# Patient Record
Sex: FEMALE | Race: WHITE | Employment: OTHER | URBAN - METROPOLITAN AREA
[De-identification: names, ages, dates, MRNs, and addresses within clinical notes are randomized per-mention and may not be internally consistent; named-entity substitution may affect disease eponyms.]

---

## 2017-01-17 RX ORDER — AMITRIPTYLINE HYDROCHLORIDE 10 MG/1
TABLET, FILM COATED ORAL
Qty: 30 TABLET | Refills: 1 | Status: SHIPPED | OUTPATIENT
Start: 2017-01-17 | End: 2017-03-01

## 2017-02-24 RX ORDER — LOSARTAN POTASSIUM 50 MG/1
TABLET ORAL
Qty: 180 TABLET | Refills: 0 | Status: SHIPPED | OUTPATIENT
Start: 2017-02-24 | End: 2017-06-12

## 2017-03-01 ENCOUNTER — OFFICE VISIT (OUTPATIENT)
Dept: FAMILY MEDICINE CLINIC | Facility: CLINIC | Age: 77
End: 2017-03-01

## 2017-03-01 ENCOUNTER — LAB ENCOUNTER (OUTPATIENT)
Dept: LAB | Age: 77
End: 2017-03-01
Attending: FAMILY MEDICINE
Payer: MEDICARE

## 2017-03-01 VITALS
BODY MASS INDEX: 23 KG/M2 | DIASTOLIC BLOOD PRESSURE: 70 MMHG | WEIGHT: 143 LBS | HEART RATE: 74 BPM | RESPIRATION RATE: 16 BRPM | SYSTOLIC BLOOD PRESSURE: 130 MMHG

## 2017-03-01 DIAGNOSIS — Z23 NEED FOR PNEUMOCOCCAL VACCINATION: ICD-10-CM

## 2017-03-01 DIAGNOSIS — E78.00 HIGH CHOLESTEROL: ICD-10-CM

## 2017-03-01 DIAGNOSIS — J41.0 SMOKERS' COUGH (HCC): ICD-10-CM

## 2017-03-01 DIAGNOSIS — J43.8 OTHER EMPHYSEMA (HCC): ICD-10-CM

## 2017-03-01 DIAGNOSIS — Z00.00 MEDICARE ANNUAL WELLNESS VISIT, SUBSEQUENT: ICD-10-CM

## 2017-03-01 DIAGNOSIS — F17.200 TOBACCO DEPENDENCE: ICD-10-CM

## 2017-03-01 DIAGNOSIS — E78.2 MIXED HYPERLIPIDEMIA: ICD-10-CM

## 2017-03-01 DIAGNOSIS — I10 ESSENTIAL HYPERTENSION: ICD-10-CM

## 2017-03-01 DIAGNOSIS — E78.2 MIXED HYPERLIPIDEMIA: Primary | ICD-10-CM

## 2017-03-01 DIAGNOSIS — M19.032 ARTHRITIS OF LEFT WRIST: ICD-10-CM

## 2017-03-01 DIAGNOSIS — E55.9 VITAMIN D DEFICIENCY: ICD-10-CM

## 2017-03-01 DIAGNOSIS — G47.09 OTHER INSOMNIA: ICD-10-CM

## 2017-03-01 LAB
25-HYDROXYVITAMIN D (TOTAL): 36.8 NG/ML (ref 30–100)
ALBUMIN SERPL-MCNC: 3.3 G/DL (ref 3.5–4.8)
ALP LIVER SERPL-CCNC: 107 U/L (ref 55–142)
ALT SERPL-CCNC: 24 U/L (ref 14–54)
AST SERPL-CCNC: 29 U/L (ref 15–41)
BASOPHILS # BLD AUTO: 0.07 X10(3) UL (ref 0–0.1)
BASOPHILS NFR BLD AUTO: 0.9 %
BILIRUB SERPL-MCNC: 0.5 MG/DL (ref 0.1–2)
BUN BLD-MCNC: 19 MG/DL (ref 8–20)
CALCIUM BLD-MCNC: 9.3 MG/DL (ref 8.3–10.3)
CHLORIDE: 108 MMOL/L (ref 101–111)
CHOLEST SMN-MCNC: 189 MG/DL (ref ?–200)
CO2: 28 MMOL/L (ref 22–32)
CREAT BLD-MCNC: 1.02 MG/DL (ref 0.55–1.02)
EOSINOPHIL # BLD AUTO: 0.35 X10(3) UL (ref 0–0.3)
EOSINOPHIL NFR BLD AUTO: 4.7 %
ERYTHROCYTE [DISTWIDTH] IN BLOOD BY AUTOMATED COUNT: 15 % (ref 11.5–16)
GLUCOSE BLD-MCNC: 98 MG/DL (ref 70–99)
HCT VFR BLD AUTO: 41.9 % (ref 34–50)
HDLC SERPL-MCNC: 125 MG/DL (ref 45–?)
HDLC SERPL: 1.51 {RATIO} (ref ?–4.44)
HGB BLD-MCNC: 13.9 G/DL (ref 12–16)
IMMATURE GRANULOCYTE COUNT: 0.01 X10(3) UL (ref 0–1)
IMMATURE GRANULOCYTE RATIO %: 0.1 %
LDLC SERPL CALC-MCNC: 48 MG/DL (ref ?–130)
LYMPHOCYTES # BLD AUTO: 2.3 X10(3) UL (ref 0.9–4)
LYMPHOCYTES NFR BLD AUTO: 31.1 %
M PROTEIN MFR SERPL ELPH: 7.1 G/DL (ref 6.1–8.3)
MCH RBC QN AUTO: 29.5 PG (ref 27–33.2)
MCHC RBC AUTO-ENTMCNC: 33.2 G/DL (ref 31–37)
MCV RBC AUTO: 89 FL (ref 81–100)
MONOCYTES # BLD AUTO: 0.48 X10(3) UL (ref 0.1–0.6)
MONOCYTES NFR BLD AUTO: 6.5 %
NEUTROPHIL ABS PRELIM: 4.19 X10 (3) UL (ref 1.3–6.7)
NEUTROPHILS # BLD AUTO: 4.19 X10(3) UL (ref 1.3–6.7)
NEUTROPHILS NFR BLD AUTO: 56.7 %
NONHDLC SERPL-MCNC: 64 MG/DL (ref ?–130)
PLATELET # BLD AUTO: 269 10(3)UL (ref 150–450)
POTASSIUM SERPL-SCNC: 4.1 MMOL/L (ref 3.6–5.1)
RBC # BLD AUTO: 4.71 X10(6)UL (ref 3.8–5.1)
RED CELL DISTRIBUTION WIDTH-SD: 49.6 FL (ref 35.1–46.3)
SODIUM SERPL-SCNC: 142 MMOL/L (ref 136–144)
TRIGLYCERIDES: 81 MG/DL (ref ?–150)
TSI SER-ACNC: 1.44 MIU/ML (ref 0.35–5.5)
VLDL: 16 MG/DL (ref 5–40)
WBC # BLD AUTO: 7.4 X10(3) UL (ref 4–13)

## 2017-03-01 PROCEDURE — 87591 N.GONORRHOEAE DNA AMP PROB: CPT

## 2017-03-01 PROCEDURE — 96160 PT-FOCUSED HLTH RISK ASSMT: CPT | Performed by: FAMILY MEDICINE

## 2017-03-01 PROCEDURE — 87491 CHLMYD TRACH DNA AMP PROBE: CPT

## 2017-03-01 PROCEDURE — 36415 COLL VENOUS BLD VENIPUNCTURE: CPT

## 2017-03-01 PROCEDURE — 80053 COMPREHEN METABOLIC PANEL: CPT

## 2017-03-01 PROCEDURE — 90732 PPSV23 VACC 2 YRS+ SUBQ/IM: CPT | Performed by: FAMILY MEDICINE

## 2017-03-01 PROCEDURE — 82306 VITAMIN D 25 HYDROXY: CPT

## 2017-03-01 PROCEDURE — G0009 ADMIN PNEUMOCOCCAL VACCINE: HCPCS | Performed by: FAMILY MEDICINE

## 2017-03-01 PROCEDURE — 85025 COMPLETE CBC W/AUTO DIFF WBC: CPT

## 2017-03-01 PROCEDURE — 84443 ASSAY THYROID STIM HORMONE: CPT

## 2017-03-01 PROCEDURE — 80061 LIPID PANEL: CPT

## 2017-03-01 RX ORDER — ATORVASTATIN CALCIUM 10 MG/1
10 TABLET, FILM COATED ORAL NIGHTLY
Qty: 90 TABLET | Refills: 1 | Status: SHIPPED | OUTPATIENT
Start: 2017-03-01 | End: 2017-10-19

## 2017-03-01 RX ORDER — TIZANIDINE 2 MG/1
2 TABLET ORAL EVERY 8 HOURS PRN
COMMUNITY
End: 2017-06-07

## 2017-03-01 RX ORDER — AMITRIPTYLINE HYDROCHLORIDE 10 MG/1
TABLET, FILM COATED ORAL
Qty: 30 TABLET | Refills: 3 | Status: SHIPPED | OUTPATIENT
Start: 2017-03-01 | End: 2017-04-21

## 2017-03-02 LAB
C TRACH DNA SPEC QL NAA+PROBE: NEGATIVE
N GONORRHOEA DNA SPEC QL NAA+PROBE: NEGATIVE

## 2017-03-03 NOTE — PROGRESS NOTES
HPI:   Micah Yepez is a 67year old female who presents for a MA (Medicare Advantage) Supervisit (Once per calendar year).   Her last annual assessment has been over 1 year: Annual Physical due on 02/08/2017         Patient Care Team: Patient Care Team: cholecystectomy; back surgery; hernia surgery; cholecystectomy; spine surgery procedure unlisted; colonoscopy & polypectomy (2/15); and colonoscopy (N/A, 2/12/2015). Her family history includes Breast Cancer (age of onset: 48) in her maternal aunt.    SO no curvature, ROM normal, no CVA tenderness   Lungs:   Clear to auscultation bilaterally, respirations unlabored   Heart:  Regular rate and rhythm, S1 and S2 normal, no murmur, rub, or gallop   Abdomen:   Soft, non-tender, bowel sounds active all four quad of left wrist  Plan: ASSAY, THYROID STIM HORMONE        -pain's stable, no new complaints. Asymptomatic, today. (F17.200) Tobacco dependence  Plan: -encouraged cessation.     (J41.0) Smokers' cough ([facility] [address]  Plan: -as above.      (Z23) Need for pneumococca Functional Status     Hearing Problems?: No    Vision Problems? : No    Difficulty walking?: No    Difficulty dressing or bathing?: No    Problems with daily activities? : No    Memory Problems?: No      Fall/Risk Assessment     Do you have 3 or more m EKG - w/ Initial Preventative Physical Exam only, or if medically necessary Electrocardiogram date07/09/2015       Colorectal Cancer Screening      Colonoscopy Screen every 10 years Colonoscopy,5 Years due on 02/12/2020 Update Health Maintenance if applica Orders placed or performed in visit on 02/19/15  -TETANUS, DIPHTHERIA TOXOIDS AND ACELLULAR PERTUSIS VACCINE (TDAP), >7 YEARS, IM USE            SPECIFIC DISEASE MONITORING Internal Lab or Procedure External Lab or Procedure   Annual Monitoring of Persis

## 2017-03-08 ENCOUNTER — TELEPHONE (OUTPATIENT)
Dept: FAMILY MEDICINE CLINIC | Facility: CLINIC | Age: 77
End: 2017-03-08

## 2017-03-08 DIAGNOSIS — E78.2 MIXED HYPERLIPIDEMIA: Primary | ICD-10-CM

## 2017-03-08 NOTE — TELEPHONE ENCOUNTER
----- Message from Raven Li MD sent at 3/7/2017  2:15 PM CST -----  Let patient know of high cholesterol. Needs to follow low fat, low cholesterol,  decrease carbs and increase activity.     Meds: continue same meds  Repeat ast/alt and lipid panel in 6

## 2017-03-08 NOTE — TELEPHONE ENCOUNTER
I called pt at [phone] and verified 2 patient identifiers: name & . I discussed results and recommendations at length with patient. Discussed normal cholesterol readings and to continue with current medication, diet, and exercise.   All orders pl

## 2017-03-29 ENCOUNTER — PATIENT OUTREACH (OUTPATIENT)
Dept: CASE MANAGEMENT | Age: 77
End: 2017-03-29

## 2017-03-29 RX ORDER — MIRTAZAPINE 30 MG/1
TABLET, FILM COATED ORAL
Qty: 90 TABLET | Refills: 0 | Status: SHIPPED
Start: 2017-03-29 | End: 2017-04-21

## 2017-03-29 NOTE — TELEPHONE ENCOUNTER
Pt was taking this medication a couple of months ago but stopped taking it because she recently moved and misplaced the bottle. Pt found the bottle and continued to take this medication.  She is now requesting a refill and states that she is not taking anyt

## 2017-04-12 ENCOUNTER — PATIENT OUTREACH (OUTPATIENT)
Dept: CASE MANAGEMENT | Age: 77
End: 2017-04-12

## 2017-04-12 NOTE — PROGRESS NOTES
4/12/2017  Spoke to Karon Nelson at length about CCM, current care plan and performed CCM assessment with Karon Nelson reviewed meds and compliance.  Reviewed pt COPD, HTN    Health Maintenance:annual up to date 3/1/17, mammogram- due April 2017 colonoscopy due in 2020

## 2017-04-20 ENCOUNTER — TELEPHONE (OUTPATIENT)
Dept: FAMILY MEDICINE CLINIC | Facility: CLINIC | Age: 77
End: 2017-04-20

## 2017-04-20 DIAGNOSIS — G47.09 OTHER INSOMNIA: Primary | ICD-10-CM

## 2017-04-21 NOTE — TELEPHONE ENCOUNTER
I spoke to patient's daughter Nidia Zeng per HIPAA. She states patient should be taking amitriptyline. Per our records, mirtazepine has not been prescribed for patient since 2015.  Per Nidia Zeng, patient will continue with amitriptyline as this is the most recently

## 2017-04-21 NOTE — TELEPHONE ENCOUNTER
From voicemail: Pt received a call from pharmacy for refill of Amitriptyline, she does not know if this is correct or not.

## 2017-04-22 RX ORDER — AMITRIPTYLINE HYDROCHLORIDE 10 MG/1
TABLET, FILM COATED ORAL
Qty: 30 TABLET | Refills: 0 | Status: SHIPPED | OUTPATIENT
Start: 2017-04-22 | End: 2017-06-05

## 2017-04-25 ENCOUNTER — OFFICE VISIT (OUTPATIENT)
Dept: FAMILY MEDICINE CLINIC | Facility: CLINIC | Age: 77
End: 2017-04-25

## 2017-04-25 VITALS
OXYGEN SATURATION: 97 % | RESPIRATION RATE: 16 BRPM | SYSTOLIC BLOOD PRESSURE: 138 MMHG | HEIGHT: 66 IN | WEIGHT: 141 LBS | TEMPERATURE: 98 F | BODY MASS INDEX: 22.66 KG/M2 | HEART RATE: 77 BPM | DIASTOLIC BLOOD PRESSURE: 80 MMHG

## 2017-04-25 DIAGNOSIS — L02.214 ABSCESS OF GROIN, RIGHT: Primary | ICD-10-CM

## 2017-04-25 PROCEDURE — 87205 SMEAR GRAM STAIN: CPT | Performed by: EMERGENCY MEDICINE

## 2017-04-25 PROCEDURE — 87186 SC STD MICRODIL/AGAR DIL: CPT | Performed by: EMERGENCY MEDICINE

## 2017-04-25 PROCEDURE — 99214 OFFICE O/P EST MOD 30 MIN: CPT | Performed by: EMERGENCY MEDICINE

## 2017-04-25 PROCEDURE — 87147 CULTURE TYPE IMMUNOLOGIC: CPT | Performed by: EMERGENCY MEDICINE

## 2017-04-25 PROCEDURE — 87070 CULTURE OTHR SPECIMN AEROBIC: CPT | Performed by: EMERGENCY MEDICINE

## 2017-04-25 RX ORDER — AMOXICILLIN AND CLAVULANATE POTASSIUM 875; 125 MG/1; MG/1
1 TABLET, FILM COATED ORAL 2 TIMES DAILY
Qty: 20 TABLET | Refills: 0 | Status: SHIPPED | OUTPATIENT
Start: 2017-04-25 | End: 2017-05-05

## 2017-04-25 NOTE — PROGRESS NOTES
Chief Complaint:   Patient presents with:  Abscess (integumentary): in pelvic area, noticed a month ago     HPI:   This is a 67year old female     C/O painful mass to right groin area. Started 1-2 months ago with a small tender area.  Recently over the pas LOSARTAN POTASSIUM 50 MG Oral Tab TAKE ONE TABLET BY MOUTH TWICE DAILY Disp: 180 tablet Rfl: 0   HYDROcodone-acetaminophen (NORCO)  MG Oral Tab Take 1 tablet by mouth every 8 (eight) hours as needed for Pain.  Disp: 20 tablet Rfl: 0   aspirin 81 MG Weight as of this encounter: 141 lb. Vital signs reviewed. Appears stated age, well groomed.   GENERAL: well developed, well nourished, well hydrated, no distress  SKIN: good skin turgor, no obvious rashes    LUNGS: clear to auscultation, no RRW  CARDIO: R

## 2017-04-25 NOTE — PATIENT INSTRUCTIONS
Thank you for choosing [facility]  To Do:  FOR ROBBY ZEE  1. Warm compresses to area 4-5 times a day for 15-20 minutes each time  2. Take antibiotics as directed  3. Follow-up in 7-10 days  4.  Area protected cover loosely with nonadherent tita leading away from the abscess  · An increase in local pain or swelling  · Fever of 100.4ºF (38ºC) or higher, or as directed by your healthcare provider  · Pus or fluid coming from the abscess  · Boil returns after getting better  Date Last Reviewed: 9/1/20

## 2017-05-01 ENCOUNTER — TELEPHONE (OUTPATIENT)
Dept: FAMILY MEDICINE CLINIC | Facility: CLINIC | Age: 77
End: 2017-05-01

## 2017-05-01 RX ORDER — SULFAMETHOXAZOLE AND TRIMETHOPRIM 800; 160 MG/1; MG/1
1 TABLET ORAL 2 TIMES DAILY
Qty: 20 TABLET | Refills: 0 | Status: SHIPPED | OUTPATIENT
Start: 2017-05-01

## 2017-05-01 NOTE — TELEPHONE ENCOUNTER
INESSA.    I called pt at [phone] and verified 2 patient identifiers: name & . I discussed results and recommendations at length with patient. All orders placed. Pt states she has no way of coming back to the office.   Her daughter got a new job and

## 2017-05-01 NOTE — TELEPHONE ENCOUNTER
----- Message from Adina Ash MD sent at 5/1/2017 11:41 AM CDT -----  Pls have pt D/C Augmentin    Pls Rx Bactrim DS 1 tab BID for 10 days  Must be rechecked in 1-2 weeks

## 2017-05-19 ENCOUNTER — PATIENT OUTREACH (OUTPATIENT)
Dept: CASE MANAGEMENT | Age: 77
End: 2017-05-19

## 2017-05-19 NOTE — PROGRESS NOTES
Following up with patient to see how everything was going. Patient states that she has to change doctor's do to transportation issues. Patient already called her insurance and changed providers.  I did mention to patient about Senior services but patient st

## 2017-06-06 RX ORDER — AMITRIPTYLINE HYDROCHLORIDE 10 MG/1
TABLET, FILM COATED ORAL
Qty: 30 TABLET | Refills: 0 | Status: SHIPPED | OUTPATIENT
Start: 2017-06-06

## 2017-06-08 RX ORDER — TIZANIDINE 2 MG/1
2 TABLET ORAL EVERY 8 HOURS PRN
Qty: 90 TABLET | Refills: 0 | Status: SHIPPED | OUTPATIENT
Start: 2017-06-08

## 2017-06-12 RX ORDER — LOSARTAN POTASSIUM 50 MG/1
TABLET ORAL
Qty: 180 TABLET | Refills: 0 | Status: SHIPPED | OUTPATIENT
Start: 2017-06-12 | End: 2017-09-09

## 2017-08-05 ENCOUNTER — HOSPITAL (OUTPATIENT)
Dept: OTHER | Age: 77
End: 2017-08-05

## 2017-08-05 ENCOUNTER — IMAGING SERVICES (OUTPATIENT)
Dept: OTHER | Age: 77
End: 2017-08-05

## 2017-09-09 RX ORDER — LOSARTAN POTASSIUM 50 MG/1
TABLET ORAL
Qty: 180 TABLET | Refills: 0 | Status: SHIPPED | OUTPATIENT
Start: 2017-09-09 | End: 2017-12-07

## 2017-10-14 ENCOUNTER — HOSPITAL (OUTPATIENT)
Dept: OTHER | Age: 77
End: 2017-10-14

## 2017-10-14 ENCOUNTER — IMAGING SERVICES (OUTPATIENT)
Dept: OTHER | Age: 77
End: 2017-10-14

## 2017-10-19 DIAGNOSIS — E78.00 HIGH CHOLESTEROL: ICD-10-CM

## 2017-10-19 RX ORDER — ATORVASTATIN CALCIUM 10 MG/1
10 TABLET, FILM COATED ORAL NIGHTLY
Qty: 90 TABLET | Refills: 1 | Status: SHIPPED | OUTPATIENT
Start: 2017-10-19 | End: 2018-05-04

## 2017-11-24 ENCOUNTER — HOSPITAL (OUTPATIENT)
Dept: OTHER | Age: 77
End: 2017-11-24
Attending: INTERNAL MEDICINE

## 2017-11-24 LAB
ANALYZER ANC (IANC): NORMAL
ANION GAP SERPL CALC-SCNC: 13 MMOL/L (ref 10–20)
BASOPHILS # BLD: 0 THOUSAND/MCL (ref 0–0.3)
BASOPHILS NFR BLD: 1 %
BUN SERPL-MCNC: 21 MG/DL (ref 6–20)
BUN/CREAT SERPL: 15 (ref 7–25)
CALCIUM SERPL-MCNC: 9 MG/DL (ref 8.4–10.2)
CHLORIDE: 105 MMOL/L (ref 98–107)
CO2 SERPL-SCNC: 27 MMOL/L (ref 21–32)
CREAT SERPL-MCNC: 1.44 MG/DL (ref 0.51–0.95)
DIFFERENTIAL METHOD BLD: NORMAL
EOSINOPHIL # BLD: 0.2 THOUSAND/MCL (ref 0.1–0.5)
EOSINOPHIL NFR BLD: 3 %
ERYTHROCYTE [DISTWIDTH] IN BLOOD: 14.4 % (ref 11–15)
GLUCOSE SERPL-MCNC: 136 MG/DL (ref 65–99)
HEMATOCRIT: 40.2 % (ref 36–46.5)
HGB BLD-MCNC: 12.9 GM/DL (ref 12–15.5)
LYMPHOCYTES # BLD: 1.9 THOUSAND/MCL (ref 1–4)
LYMPHOCYTES NFR BLD: 32 %
MAGNESIUM SERPL-MCNC: 1.7 MG/DL (ref 1.7–2.4)
MCH RBC QN AUTO: 28.8 PG (ref 26–34)
MCHC RBC AUTO-ENTMCNC: 32.1 GM/DL (ref 32–36.5)
MCV RBC AUTO: 89.7 FL (ref 78–100)
MONOCYTES # BLD: 0.3 THOUSAND/MCL (ref 0.3–0.9)
MONOCYTES NFR BLD: 5 %
NEUTROPHILS # BLD: 3.6 THOUSAND/MCL (ref 1.8–7.7)
NEUTROPHILS NFR BLD: 59 %
NEUTS SEG NFR BLD: NORMAL %
PERCENT NRBC: NORMAL
PLATELET # BLD: 238 THOUSAND/MCL (ref 140–450)
POTASSIUM SERPL-SCNC: 3.8 MMOL/L (ref 3.4–5.1)
RBC # BLD: 4.48 MILLION/MCL (ref 4–5.2)
SODIUM SERPL-SCNC: 141 MMOL/L (ref 135–145)
TROPONIN I SERPL HS-MCNC: 0.02 NG/ML
TSH SERPL-ACNC: 2.17 MCUNIT/ML (ref 0.35–5)
WBC # BLD: 6 THOUSAND/MCL (ref 4.2–11)

## 2017-11-25 ENCOUNTER — DIAGNOSTIC TRANS (OUTPATIENT)
Dept: OTHER | Age: 77
End: 2017-11-25

## 2017-11-25 LAB
ANION GAP SERPL CALC-SCNC: 9 MMOL/L (ref 10–20)
APPEARANCE UR: CLEAR
BACTERIA #/AREA URNS HPF: NORMAL /HPF
BILIRUB UR QL: NEGATIVE
BUN SERPL-MCNC: 17 MG/DL (ref 6–20)
BUN/CREAT SERPL: 14 (ref 7–25)
CALCIUM SERPL-MCNC: 8.5 MG/DL (ref 8.4–10.2)
CHLORIDE: 109 MMOL/L (ref 98–107)
CO2 SERPL-SCNC: 28 MMOL/L (ref 21–32)
COLOR UR: YELLOW
CREAT SERPL-MCNC: 1.18 MG/DL (ref 0.51–0.95)
GLUCOSE SERPL-MCNC: 80 MG/DL (ref 65–99)
GLUCOSE UR-MCNC: NEGATIVE MG/DL
HGB UR QL: ABNORMAL
HYALINE CASTS #/AREA URNS LPF: NORMAL /LPF (ref 0–5)
KETONES UR-MCNC: NEGATIVE MG/DL
LEUKOCYTE ESTERASE UR QL STRIP: ABNORMAL
MICROSCOPIC (MT): ABNORMAL
NITRITE UR QL: NEGATIVE
PH UR: 6.5 UNIT (ref 5–7)
POTASSIUM SERPL-SCNC: 3.6 MMOL/L (ref 3.4–5.1)
PROT UR QL: NEGATIVE MG/DL
RBC #/AREA URNS HPF: NORMAL /HPF (ref 0–3)
SODIUM SERPL-SCNC: 142 MMOL/L (ref 135–145)
SP GR UR: <1.005 (ref 1–1.03)
SPECIMEN SOURCE: ABNORMAL
SQUAMOUS #/AREA URNS HPF: NORMAL /HPF (ref 0–5)
UROBILINOGEN UR QL: 0.2 MG/DL (ref 0–1)
WBC #/AREA URNS HPF: NORMAL /HPF (ref 0–5)

## 2017-12-07 RX ORDER — LOSARTAN POTASSIUM 50 MG/1
TABLET ORAL
Qty: 180 TABLET | Refills: 0 | Status: SHIPPED | OUTPATIENT
Start: 2017-12-07

## 2017-12-07 NOTE — TELEPHONE ENCOUNTER
Medication(s) to Refill:   Pending Prescriptions Disp Refills    LOSARTAN POTASSIUM 50 MG Oral Tab [Pharmacy Med Name: LOSARTAN POTASSIUM 50 MG TAB] 180 tablet 0     Sig: TAKE ONE TABLET BY MOUTH TWICE DAILY           Last Time Medication was Filled:  9/9/

## 2018-02-02 ENCOUNTER — PATIENT OUTREACH (OUTPATIENT)
Dept: FAMILY MEDICINE CLINIC | Facility: CLINIC | Age: 78
End: 2018-02-02

## 2018-02-02 NOTE — PROGRESS NOTES
Spoke to pt and pt stated she has moved to [facility] and found a new PCP, pt aware to change ins

## 2018-02-03 ENCOUNTER — HOSPITAL (OUTPATIENT)
Dept: OTHER | Age: 78
End: 2018-02-03

## 2018-02-13 LAB
ALBUMIN SERPL-MCNC: 3.5 GM/DL (ref 3.6–5.1)
ALP SERPL-CCNC: 98 UNIT/L (ref 45–117)
ALT SERPL-CCNC: 21 UNIT/L
ANALYZER ANC (IANC): NORMAL
ANION GAP SERPL CALC-SCNC: 13 MMOL/L (ref 10–20)
APPEARANCE UR: CLEAR
AST SERPL-CCNC: 22 UNIT/L
BACTERIA #/AREA URNS HPF: NORMAL /HPF
BASOPHILS # BLD: 0 THOUSAND/MCL (ref 0–0.3)
BASOPHILS NFR BLD: 0 %
BILIRUB CONJ SERPL-MCNC: 0.1 MG/DL (ref 0–0.2)
BILIRUB SERPL-MCNC: 0.6 MG/DL (ref 0.2–1)
BILIRUB UR QL: NEGATIVE
BUN SERPL-MCNC: 15 MG/DL (ref 6–20)
BUN/CREAT SERPL: 15 (ref 7–25)
CALCIUM SERPL-MCNC: 9.6 MG/DL (ref 8.4–10.2)
CHLORIDE: 102 MMOL/L (ref 98–107)
CO2 SERPL-SCNC: 27 MMOL/L (ref 21–32)
COLOR UR: YELLOW
CREAT SERPL-MCNC: 1.03 MG/DL (ref 0.51–0.95)
DIFFERENTIAL METHOD BLD: NORMAL
EOSINOPHIL # BLD: 0.1 THOUSAND/MCL (ref 0.1–0.5)
EOSINOPHIL NFR BLD: 1 %
ERYTHROCYTE [DISTWIDTH] IN BLOOD: 14.4 % (ref 11–15)
GLUCOSE SERPL-MCNC: 97 MG/DL (ref 65–99)
GLUCOSE UR-MCNC: NEGATIVE MG/DL
HEMATOCRIT: 41.1 % (ref 36–46.5)
HGB BLD-MCNC: 13.6 GM/DL (ref 12–15.5)
HGB UR QL: ABNORMAL
HYALINE CASTS #/AREA URNS LPF: NORMAL /LPF (ref 0–5)
KETONES UR-MCNC: NEGATIVE MG/DL
LEUKOCYTE ESTERASE UR QL STRIP: NEGATIVE
LIPASE SERPL-CCNC: 110 UNIT/L (ref 73–393)
LYMPHOCYTES # BLD: 1.9 THOUSAND/MCL (ref 1–4)
LYMPHOCYTES NFR BLD: 22 %
MCH RBC QN AUTO: 29 PG (ref 26–34)
MCHC RBC AUTO-ENTMCNC: 33.1 GM/DL (ref 32–36.5)
MCV RBC AUTO: 87.6 FL (ref 78–100)
MICROSCOPIC (MT): ABNORMAL
MONOCYTES # BLD: 0.9 THOUSAND/MCL (ref 0.3–0.9)
MONOCYTES NFR BLD: 10 %
NEUTROPHILS # BLD: 5.8 THOUSAND/MCL (ref 1.8–7.7)
NEUTROPHILS NFR BLD: 67 %
NEUTS SEG NFR BLD: NORMAL %
NITRITE UR QL: NEGATIVE
PERCENT NRBC: NORMAL
PH UR: 8 UNIT (ref 5–7)
PLATELET # BLD: 268 THOUSAND/MCL (ref 140–450)
POTASSIUM SERPL-SCNC: 3.6 MMOL/L (ref 3.4–5.1)
PROT SERPL-MCNC: 7.3 GM/DL (ref 6.4–8.2)
PROT UR QL: ABNORMAL MG/DL
RBC # BLD: 4.69 MILLION/MCL (ref 4–5.2)
RBC #/AREA URNS HPF: NORMAL /HPF (ref 0–3)
SODIUM SERPL-SCNC: 138 MMOL/L (ref 135–145)
SP GR UR: 1.01 (ref 1–1.03)
SPECIMEN SOURCE: ABNORMAL
SQUAMOUS #/AREA URNS HPF: NORMAL /HPF (ref 0–5)
UROBILINOGEN UR QL: 0.2 MG/DL (ref 0–1)
WBC # BLD: 8.7 THOUSAND/MCL (ref 4.2–11)
WBC #/AREA URNS HPF: NORMAL /HPF (ref 0–5)

## 2018-02-14 ENCOUNTER — HOSPITAL (OUTPATIENT)
Dept: OTHER | Age: 78
End: 2018-02-14

## 2018-02-14 ENCOUNTER — IMAGING SERVICES (OUTPATIENT)
Dept: OTHER | Age: 78
End: 2018-02-14

## 2018-02-14 ENCOUNTER — DIAGNOSTIC TRANS (OUTPATIENT)
Dept: OTHER | Age: 78
End: 2018-02-14

## 2018-02-14 LAB
ANA SER QL IA: NEGATIVE
APPEARANCE UR: CLEAR
BACTERIA #/AREA URNS HPF: ABNORMAL /HPF
BILIRUB UR QL: NEGATIVE
C3 SERPL-MCNC: 80 MG/DL (ref 79–152)
C4 SERPL-MCNC: 20.1 MG/DL (ref 16–38)
CANCER AG19-9 SERPL-ACNC: 6 UNIT/ML (ref 0–35)
CHOLEST SERPL-MCNC: 154 MG/DL
CHOLEST/HDLC SERPL: 1.5 {RATIO}
COLOR UR: YELLOW
CRP SERPL-MCNC: 8.2 MG/DL
ERYTHROCYTE [SEDIMENTATION RATE] IN BLOOD BY WESTERGREN METHOD: 25 MM/HR (ref 0–20)
GLUCOSE BLDC GLUCOMTR-MCNC: 94 MG/DL (ref 65–99)
GLUCOSE UR-MCNC: NEGATIVE MG/DL
HDLC SERPL-MCNC: 103 MG/DL
HGB UR QL: ABNORMAL
HYALINE CASTS #/AREA URNS LPF: ABNORMAL /LPF (ref 0–5)
KETONES UR-MCNC: NEGATIVE MG/DL
LDLC SERPL CALC-MCNC: 40 MG/DL
LEUKOCYTE ESTERASE UR QL STRIP: NEGATIVE
MUCOUS THREADS URNS QL MICRO: PRESENT
NITRITE UR QL: NEGATIVE
NONHDLC SERPL-MCNC: 51 MG/DL
PH UR: 6.5 UNIT (ref 5–7)
PROT UR QL: NEGATIVE MG/DL
RBC #/AREA URNS HPF: ABNORMAL /HPF (ref 0–3)
SP GR UR: 1.01 (ref 1–1.03)
SPECIMEN SOURCE: ABNORMAL
SQUAMOUS #/AREA URNS HPF: ABNORMAL /HPF (ref 0–5)
TRIGLYCERIDE (TRIGP): 53 MG/DL
TSH SERPL-ACNC: 2.23 MCUNIT/ML (ref 0.35–5)
URATE SERPL-MCNC: 3.9 MG/DL (ref 2.6–5.9)
UROBILINOGEN UR QL: 0.2 MG/DL (ref 0–1)
WBC #/AREA URNS HPF: ABNORMAL /HPF (ref 0–5)

## 2018-02-15 LAB
ANION GAP SERPL CALC-SCNC: 12 MMOL/L (ref 10–20)
BUN SERPL-MCNC: 16 MG/DL (ref 6–20)
BUN/CREAT SERPL: 16 (ref 7–25)
CALCIUM SERPL-MCNC: 9.3 MG/DL (ref 8.4–10.2)
CHLORIDE: 105 MMOL/L (ref 98–107)
CO2 SERPL-SCNC: 27 MMOL/L (ref 21–32)
CREAT SERPL-MCNC: 0.98 MG/DL (ref 0.51–0.95)
GLUCOSE SERPL-MCNC: 108 MG/DL (ref 65–99)
POTASSIUM SERPL-SCNC: 4.1 MMOL/L (ref 3.4–5.1)
SODIUM SERPL-SCNC: 140 MMOL/L (ref 135–145)

## 2018-04-04 ENCOUNTER — HOSPITAL (OUTPATIENT)
Dept: OTHER | Age: 78
End: 2018-04-04
Attending: PAIN MEDICINE

## 2018-04-25 ENCOUNTER — HOSPITAL (OUTPATIENT)
Dept: OTHER | Age: 78
End: 2018-04-25
Attending: PAIN MEDICINE

## 2018-05-04 ENCOUNTER — TELEPHONE (OUTPATIENT)
Dept: FAMILY MEDICINE CLINIC | Facility: CLINIC | Age: 78
End: 2018-05-04

## 2018-05-04 DIAGNOSIS — E78.00 HIGH CHOLESTEROL: ICD-10-CM

## 2018-05-04 RX ORDER — ATORVASTATIN CALCIUM 10 MG/1
10 TABLET, FILM COATED ORAL NIGHTLY
Qty: 30 TABLET | Refills: 0 | Status: SHIPPED | OUTPATIENT
Start: 2018-05-04 | End: 2018-05-04

## 2018-05-04 NOTE — TELEPHONE ENCOUNTER
Please cancel this script as the patients primary PCP is Dr Ayad Mejia. Spoke with Artie Matias patients daughter patient has not been here in over a year.

## 2018-05-18 DIAGNOSIS — E78.00 HIGH CHOLESTEROL: ICD-10-CM

## 2018-05-18 RX ORDER — ATORVASTATIN CALCIUM 10 MG/1
10 TABLET, FILM COATED ORAL NIGHTLY
Qty: 90 TABLET | Refills: 1 | OUTPATIENT
Start: 2018-05-18

## 2018-06-27 ENCOUNTER — HOSPITAL (OUTPATIENT)
Dept: OTHER | Age: 78
End: 2018-06-27
Attending: INTERNAL MEDICINE

## 2018-06-27 LAB
ALBUMIN SERPL-MCNC: 2.8 GM/DL (ref 3.6–5.1)
ALP SERPL-CCNC: 78 UNIT/L (ref 45–117)
ALT SERPL-CCNC: 17 UNIT/L
ANALYZER ANC (IANC): ABNORMAL
ANION GAP SERPL CALC-SCNC: 12 MMOL/L (ref 10–20)
APPEARANCE UR: CLEAR
APTT PPP: 44 SECONDS (ref 22–30)
APTT PPP: ABNORMAL S
AST SERPL-CCNC: 17 UNIT/L
BACTERIA #/AREA URNS HPF: ABNORMAL /HPF
BASOPHILS # BLD: 0 THOUSAND/MCL (ref 0–0.3)
BASOPHILS NFR BLD: 1 %
BILIRUB CONJ SERPL-MCNC: 0.1 MG/DL (ref 0–0.2)
BILIRUB SERPL-MCNC: 0.3 MG/DL (ref 0.2–1)
BILIRUB UR QL: NEGATIVE
BUN SERPL-MCNC: 17 MG/DL (ref 6–20)
BUN/CREAT SERPL: 19 (ref 7–25)
CALCIUM SERPL-MCNC: 8.5 MG/DL (ref 8.4–10.2)
CHLORIDE: 109 MMOL/L (ref 98–107)
CO2 SERPL-SCNC: 26 MMOL/L (ref 21–32)
COLOR UR: YELLOW
CREAT SERPL-MCNC: 0.88 MG/DL (ref 0.51–0.95)
DIFFERENTIAL METHOD BLD: ABNORMAL
EOSINOPHIL # BLD: 0.1 THOUSAND/MCL (ref 0.1–0.5)
EOSINOPHIL NFR BLD: 2 %
ERYTHROCYTE [DISTWIDTH] IN BLOOD: 16.6 % (ref 11–15)
FERRITIN SERPL-MCNC: 8 NG/ML (ref 8–252)
GLUCOSE SERPL-MCNC: 96 MG/DL (ref 65–99)
GLUCOSE UR-MCNC: NEGATIVE MG/DL
HEMATOCRIT: 20.3 % (ref 36–46.5)
HGB BLD-MCNC: 6.1 GM/DL (ref 12–15.5)
HGB UR QL: ABNORMAL
HYALINE CASTS #/AREA URNS LPF: ABNORMAL /LPF (ref 0–5)
INR PPP: 1
IRON SATN MFR SERPL: 3 % (ref 15–45)
IRON SERPL-MCNC: 9 MCG/DL (ref 50–170)
KETONES UR-MCNC: NEGATIVE MG/DL
LEUKOCYTE ESTERASE UR QL STRIP: NEGATIVE
LIPASE SERPL-CCNC: 133 UNIT/L (ref 73–393)
LYMPHOCYTES # BLD: 1.2 THOUSAND/MCL (ref 1–4)
LYMPHOCYTES NFR BLD: 18 %
MCH RBC QN AUTO: 22.7 PG (ref 26–34)
MCHC RBC AUTO-ENTMCNC: 30 GM/DL (ref 32–36.5)
MCV RBC AUTO: 75.5 FL (ref 78–100)
MONOCYTES # BLD: 0.5 THOUSAND/MCL (ref 0.3–0.9)
MONOCYTES NFR BLD: 9 %
NEUTROPHILS # BLD: 4.6 THOUSAND/MCL (ref 1.8–7.7)
NEUTROPHILS NFR BLD: 70 %
NEUTS SEG NFR BLD: ABNORMAL %
NITRITE UR QL: NEGATIVE
NRBC (NRBCRE): ABNORMAL
PH UR: 6 UNIT (ref 5–7)
PLATELET # BLD: 311 THOUSAND/MCL (ref 140–450)
POTASSIUM SERPL-SCNC: 3.4 MMOL/L (ref 3.4–5.1)
PROT SERPL-MCNC: 6 GM/DL (ref 6.4–8.2)
PROT UR QL: NEGATIVE MG/DL
PROTHROMBIN TIME: 10.6 SECONDS (ref 9.7–11.8)
PROTHROMBIN TIME: NORMAL
RBC # BLD: 2.69 MILLION/MCL (ref 4–5.2)
RBC #/AREA URNS HPF: ABNORMAL /HPF (ref 0–3)
SODIUM SERPL-SCNC: 144 MMOL/L (ref 135–145)
SP GR UR: <1.005 (ref 1–1.03)
SPECIMEN SOURCE: ABNORMAL
SQUAMOUS #/AREA URNS HPF: ABNORMAL /HPF (ref 0–5)
TIBC SERPL-MCNC: 334 MCG/DL (ref 250–450)
UROBILINOGEN UR QL: 0.2 MG/DL (ref 0–1)
WBC # BLD: 6.4 THOUSAND/MCL (ref 4.2–11)
WBC #/AREA URNS HPF: ABNORMAL /HPF (ref 0–5)

## 2018-06-28 ENCOUNTER — CHARTING TRANS (OUTPATIENT)
Dept: OTHER | Age: 78
End: 2018-06-28

## 2018-06-28 LAB
ALBUMIN SERPL-MCNC: 2.7 GM/DL (ref 3.6–5.1)
ALBUMIN/GLOB SERPL: 0.9 {RATIO} (ref 1–2.4)
ALP SERPL-CCNC: 74 UNIT/L (ref 45–117)
ALT SERPL-CCNC: 16 UNIT/L
ANALYZER ANC (IANC): ABNORMAL
ANION GAP SERPL CALC-SCNC: 12 MMOL/L (ref 10–20)
AST SERPL-CCNC: 20 UNIT/L
BASOPHILS # BLD: 0 THOUSAND/MCL (ref 0–0.3)
BASOPHILS NFR BLD: 1 %
BILIRUB SERPL-MCNC: 0.6 MG/DL (ref 0.2–1)
BUN SERPL-MCNC: 11 MG/DL (ref 6–20)
BUN/CREAT SERPL: 13 (ref 7–25)
CALCIUM SERPL-MCNC: 8.3 MG/DL (ref 8.4–10.2)
CHLORIDE: 107 MMOL/L (ref 98–107)
CO2 SERPL-SCNC: 28 MMOL/L (ref 21–32)
CREAT SERPL-MCNC: 0.84 MG/DL (ref 0.51–0.95)
DIFFERENTIAL METHOD BLD: ABNORMAL
EOSINOPHIL # BLD: 0.2 THOUSAND/MCL (ref 0.1–0.5)
EOSINOPHIL NFR BLD: 4 %
ERYTHROCYTE [DISTWIDTH] IN BLOOD: 17.1 % (ref 11–15)
GLOBULIN SER-MCNC: 3 GM/DL (ref 2–4)
GLUCOSE SERPL-MCNC: 89 MG/DL (ref 65–99)
HEMATOCRIT: 23 % (ref 36–46.5)
HEMATOCRIT: 24.6 % (ref 36–46.5)
HGB BLD-MCNC: 7.1 GM/DL (ref 12–15.5)
HGB BLD-MCNC: 7.4 GM/DL (ref 12–15.5)
IRON SATN MFR SERPL: 6 % (ref 15–45)
IRON SERPL-MCNC: 18 MCG/DL (ref 50–170)
LYMPHOCYTES # BLD: 1.4 THOUSAND/MCL (ref 1–4)
LYMPHOCYTES NFR BLD: 35 %
MCH RBC QN AUTO: 23.8 PG (ref 26–34)
MCHC RBC AUTO-ENTMCNC: 30.9 GM/DL (ref 32–36.5)
MCV RBC AUTO: 77.2 FL (ref 78–100)
MONOCYTES # BLD: 0.3 THOUSAND/MCL (ref 0.3–0.9)
MONOCYTES NFR BLD: 8 %
NEUTROPHILS # BLD: 2.2 THOUSAND/MCL (ref 1.8–7.7)
NEUTROPHILS NFR BLD: 52 %
NEUTS SEG NFR BLD: ABNORMAL %
NRBC (NRBCRE): ABNORMAL
PLATELET # BLD: 299 THOUSAND/MCL (ref 140–450)
POTASSIUM SERPL-SCNC: 2.8 MMOL/L (ref 3.4–5.1)
PROT SERPL-MCNC: 5.7 GM/DL (ref 6.4–8.2)
RBC # BLD: 2.98 MILLION/MCL (ref 4–5.2)
SODIUM SERPL-SCNC: 144 MMOL/L (ref 135–145)
TIBC SERPL-MCNC: 322 MCG/DL (ref 250–450)
WBC # BLD: 4.2 THOUSAND/MCL (ref 4.2–11)

## 2018-06-29 ENCOUNTER — CHARTING TRANS (OUTPATIENT)
Dept: OTHER | Age: 78
End: 2018-06-29

## 2018-06-29 LAB
HEMATOCRIT: 22.9 % (ref 36–46.5)
HGB BLD-MCNC: 7.2 GM/DL (ref 12–15.5)
POTASSIUM SERPL-SCNC: 3.2 MMOL/L (ref 3.4–5.1)

## 2018-06-30 LAB
ALBUMIN SERPL-MCNC: 2.6 GM/DL (ref 3.6–5.1)
ALBUMIN/GLOB SERPL: 0.9 {RATIO} (ref 1–2.4)
ALP SERPL-CCNC: 70 UNIT/L (ref 45–117)
ALT SERPL-CCNC: 18 UNIT/L
ANALYZER ANC (IANC): ABNORMAL
ANION GAP SERPL CALC-SCNC: 13 MMOL/L (ref 10–20)
AST SERPL-CCNC: 22 UNIT/L
BASOPHILS # BLD: 0 THOUSAND/MCL (ref 0–0.3)
BASOPHILS NFR BLD: 1 %
BILIRUB SERPL-MCNC: 0.4 MG/DL (ref 0.2–1)
BUN SERPL-MCNC: 10 MG/DL (ref 6–20)
BUN/CREAT SERPL: 13 (ref 7–25)
CALCIUM SERPL-MCNC: 8.5 MG/DL (ref 8.4–10.2)
CHLORIDE: 108 MMOL/L (ref 98–107)
CO2 SERPL-SCNC: 24 MMOL/L (ref 21–32)
CREAT SERPL-MCNC: 0.77 MG/DL (ref 0.51–0.95)
DIFFERENTIAL METHOD BLD: ABNORMAL
EOSINOPHIL # BLD: 0.1 THOUSAND/MCL (ref 0.1–0.5)
EOSINOPHIL NFR BLD: 3 %
ERYTHROCYTE [DISTWIDTH] IN BLOOD: 17.3 % (ref 11–15)
GLOBULIN SER-MCNC: 3 GM/DL (ref 2–4)
GLUCOSE SERPL-MCNC: 96 MG/DL (ref 65–99)
HEMATOCRIT: 24.1 % (ref 36–46.5)
HGB BLD-MCNC: 7.2 GM/DL (ref 12–15.5)
LYMPHOCYTES # BLD: 1.4 THOUSAND/MCL (ref 1–4)
LYMPHOCYTES NFR BLD: 34 %
MCH RBC QN AUTO: 22.6 PG (ref 26–34)
MCHC RBC AUTO-ENTMCNC: 29.9 GM/DL (ref 32–36.5)
MCV RBC AUTO: 75.8 FL (ref 78–100)
MONOCYTES # BLD: 0.4 THOUSAND/MCL (ref 0.3–0.9)
MONOCYTES NFR BLD: 9 %
NEUTROPHILS # BLD: 2.2 THOUSAND/MCL (ref 1.8–7.7)
NEUTROPHILS NFR BLD: 53 %
NEUTS SEG NFR BLD: ABNORMAL %
NRBC (NRBCRE): ABNORMAL
PLATELET # BLD: 304 THOUSAND/MCL (ref 140–450)
POTASSIUM SERPL-SCNC: 3.6 MMOL/L (ref 3.4–5.1)
PROT SERPL-MCNC: 5.6 GM/DL (ref 6.4–8.2)
RBC # BLD: 3.18 MILLION/MCL (ref 4–5.2)
SODIUM SERPL-SCNC: 141 MMOL/L (ref 135–145)
WBC # BLD: 4 THOUSAND/MCL (ref 4.2–11)

## 2018-07-01 LAB
ANALYZER ANC (IANC): ABNORMAL
ANION GAP SERPL CALC-SCNC: 12 MMOL/L (ref 10–20)
BASOPHILS # BLD: 0 THOUSAND/MCL (ref 0–0.3)
BASOPHILS NFR BLD: 1 %
BUN SERPL-MCNC: 10 MG/DL (ref 6–20)
BUN/CREAT SERPL: 12 (ref 7–25)
CALCIUM SERPL-MCNC: 8.4 MG/DL (ref 8.4–10.2)
CHLORIDE: 107 MMOL/L (ref 98–107)
CO2 SERPL-SCNC: 27 MMOL/L (ref 21–32)
CREAT SERPL-MCNC: 0.86 MG/DL (ref 0.51–0.95)
DIFFERENTIAL METHOD BLD: ABNORMAL
EOSINOPHIL # BLD: 0.2 THOUSAND/MCL (ref 0.1–0.5)
EOSINOPHIL NFR BLD: 5 %
ERYTHROCYTE [DISTWIDTH] IN BLOOD: 17.4 % (ref 11–15)
GLUCOSE SERPL-MCNC: 97 MG/DL (ref 65–99)
HEMATOCRIT: 22.2 % (ref 36–46.5)
HEMATOCRIT: 26.6 % (ref 36–46.5)
HGB BLD-MCNC: 6.9 GM/DL (ref 12–15.5)
HGB BLD-MCNC: 8.2 GM/DL (ref 12–15.5)
LYMPHOCYTES # BLD: 1.3 THOUSAND/MCL (ref 1–4)
LYMPHOCYTES NFR BLD: 37 %
MCH RBC QN AUTO: 24 PG (ref 26–34)
MCHC RBC AUTO-ENTMCNC: 31.1 GM/DL (ref 32–36.5)
MCV RBC AUTO: 77.1 FL (ref 78–100)
MONOCYTES # BLD: 0.3 THOUSAND/MCL (ref 0.3–0.9)
MONOCYTES NFR BLD: 9 %
NEUTROPHILS # BLD: 1.7 THOUSAND/MCL (ref 1.8–7.7)
NEUTROPHILS NFR BLD: 48 %
NEUTS SEG NFR BLD: ABNORMAL %
NRBC (NRBCRE): ABNORMAL
PLATELET # BLD: 269 THOUSAND/MCL (ref 140–450)
POTASSIUM SERPL-SCNC: 3.6 MMOL/L (ref 3.4–5.1)
RBC # BLD: 2.88 MILLION/MCL (ref 4–5.2)
SODIUM SERPL-SCNC: 142 MMOL/L (ref 135–145)
WBC # BLD: 3.5 THOUSAND/MCL (ref 4.2–11)

## 2018-07-02 ENCOUNTER — DIAGNOSTIC TRANS (OUTPATIENT)
Dept: OTHER | Age: 78
End: 2018-07-02

## 2018-07-02 LAB
ANALYZER ANC (IANC): ABNORMAL
ANION GAP SERPL CALC-SCNC: 10 MMOL/L (ref 10–20)
BASOPHILS # BLD: 0 THOUSAND/MCL (ref 0–0.3)
BASOPHILS NFR BLD: 0 %
BUN SERPL-MCNC: 12 MG/DL (ref 6–20)
BUN/CREAT SERPL: 13 (ref 7–25)
CALCIUM SERPL-MCNC: 8.8 MG/DL (ref 8.4–10.2)
CHLORIDE: 109 MMOL/L (ref 98–107)
CO2 SERPL-SCNC: 28 MMOL/L (ref 21–32)
CREAT SERPL-MCNC: 0.93 MG/DL (ref 0.51–0.95)
DIFFERENTIAL METHOD BLD: ABNORMAL
EOSINOPHIL # BLD: 0.2 THOUSAND/MCL (ref 0.1–0.5)
EOSINOPHIL NFR BLD: 3 %
ERYTHROCYTE [DISTWIDTH] IN BLOOD: 17.1 % (ref 11–15)
GLUCOSE SERPL-MCNC: 90 MG/DL (ref 65–99)
HEMATOCRIT: 26.1 % (ref 36–46.5)
HGB BLD-MCNC: 8 GM/DL (ref 12–15.5)
LYMPHOCYTES # BLD: 1.7 THOUSAND/MCL (ref 1–4)
LYMPHOCYTES NFR BLD: 31 %
MCH RBC QN AUTO: 23.9 PG (ref 26–34)
MCHC RBC AUTO-ENTMCNC: 30.7 GM/DL (ref 32–36.5)
MCV RBC AUTO: 77.9 FL (ref 78–100)
MONOCYTES # BLD: 0.5 THOUSAND/MCL (ref 0.3–0.9)
MONOCYTES NFR BLD: 9 %
NEUTROPHILS # BLD: 3.1 THOUSAND/MCL (ref 1.8–7.7)
NEUTROPHILS NFR BLD: 57 %
NEUTS SEG NFR BLD: ABNORMAL %
NRBC (NRBCRE): ABNORMAL
PLATELET # BLD: 266 THOUSAND/MCL (ref 140–450)
POTASSIUM SERPL-SCNC: 4.1 MMOL/L (ref 3.4–5.1)
RBC # BLD: 3.35 MILLION/MCL (ref 4–5.2)
SODIUM SERPL-SCNC: 143 MMOL/L (ref 135–145)
WBC # BLD: 5.5 THOUSAND/MCL (ref 4.2–11)

## 2018-07-03 ENCOUNTER — IMAGING SERVICES (OUTPATIENT)
Dept: OTHER | Age: 78
End: 2018-07-03

## 2018-07-03 LAB
ANALYZER ANC (IANC): ABNORMAL
APPEARANCE FLD: YELLOW
BASOPHILS NFR SNV: ABNORMAL %
CRYSTALS SNV MICRO: ABNORMAL
EOSINOPHIL NFR SNV: ABNORMAL %
ERYTHROCYTE [DISTWIDTH] IN BLOOD: 17.4 % (ref 11–15)
HEMATOCRIT: 23.9 % (ref 36–46.5)
HEMATOCRIT: 24.6 % (ref 36–46.5)
HEMATOCRIT: 24.9 % (ref 36–46.5)
HGB BLD-MCNC: 7.3 GM/DL (ref 12–15.5)
HGB BLD-MCNC: 7.6 GM/DL (ref 12–15.5)
HGB BLD-MCNC: 7.6 GM/DL (ref 12–15.5)
LYMPHOCYTES NFR SNV: 5 % (ref 0–78)
MCH RBC QN AUTO: 23.6 PG (ref 26–34)
MCHC RBC AUTO-ENTMCNC: 30.5 GM/DL (ref 32–36.5)
MCV RBC AUTO: 77.3 FL (ref 78–100)
MONOS+MACROS NFR SNV: ABNORMAL % (ref 0–71)
NEUTS SEG NFR SNV: 95 % (ref 0–25)
NRBC (NRBCRE): ABNORMAL
NUC CELL # SNV MANUAL: ABNORMAL /MCL (ref 0–200)
PLATELET # BLD: 273 THOUSAND/MCL (ref 140–450)
RBC # BLD: 3.09 MILLION/MCL (ref 4–5.2)
SPECIMEN SOURCE: ABNORMAL
SPECIMEN VOL FLD: 10 ML
URATE SERPL-MCNC: 3.2 MG/DL (ref 2.6–5.9)
WBC # BLD: 5.7 THOUSAND/MCL (ref 4.2–11)

## 2018-07-04 LAB
ANALYZER ANC (IANC): ABNORMAL
ANION GAP SERPL CALC-SCNC: 11 MMOL/L (ref 10–20)
BASOPHILS # BLD: 0 THOUSAND/MCL (ref 0–0.3)
BASOPHILS NFR BLD: 0 %
BUN SERPL-MCNC: 13 MG/DL (ref 6–20)
BUN/CREAT SERPL: 15 (ref 7–25)
CALCIUM SERPL-MCNC: 9.1 MG/DL (ref 8.4–10.2)
CHLORIDE: 105 MMOL/L (ref 98–107)
CO2 SERPL-SCNC: 29 MMOL/L (ref 21–32)
CREAT SERPL-MCNC: 0.87 MG/DL (ref 0.51–0.95)
DIFFERENTIAL METHOD BLD: ABNORMAL
EOSINOPHIL # BLD: 0 THOUSAND/MCL (ref 0.1–0.5)
EOSINOPHIL NFR BLD: 0 %
ERYTHROCYTE [DISTWIDTH] IN BLOOD: 17.5 % (ref 11–15)
GLUCOSE SERPL-MCNC: 125 MG/DL (ref 65–99)
HEMATOCRIT: 26.1 % (ref 36–46.5)
HGB BLD-MCNC: 7.7 GM/DL (ref 12–15.5)
LYMPHOCYTES # BLD: 0.9 THOUSAND/MCL (ref 1–4)
LYMPHOCYTES NFR BLD: 23 %
MCH RBC QN AUTO: 22.7 PG (ref 26–34)
MCHC RBC AUTO-ENTMCNC: 29.5 GM/DL (ref 32–36.5)
MCV RBC AUTO: 77 FL (ref 78–100)
MONOCYTES # BLD: 0.1 THOUSAND/MCL (ref 0.3–0.9)
MONOCYTES NFR BLD: 3 %
NEUTROPHILS # BLD: 2.9 THOUSAND/MCL (ref 1.8–7.7)
NEUTROPHILS NFR BLD: 74 %
NEUTS SEG NFR BLD: ABNORMAL %
NRBC (NRBCRE): ABNORMAL
PLATELET # BLD: 288 THOUSAND/MCL (ref 140–450)
POTASSIUM SERPL-SCNC: 4.5 MMOL/L (ref 3.4–5.1)
RBC # BLD: 3.39 MILLION/MCL (ref 4–5.2)
SODIUM SERPL-SCNC: 140 MMOL/L (ref 135–145)
WBC # BLD: 4 THOUSAND/MCL (ref 4.2–11)

## 2018-07-05 ENCOUNTER — CHARTING TRANS (OUTPATIENT)
Dept: OTHER | Age: 78
End: 2018-07-05

## 2018-07-06 ENCOUNTER — CHARTING TRANS (OUTPATIENT)
Dept: OTHER | Age: 78
End: 2018-07-06

## 2018-07-07 ENCOUNTER — CHARTING TRANS (OUTPATIENT)
Dept: OTHER | Age: 78
End: 2018-07-07

## 2018-07-07 LAB
ANALYZER ANC (IANC): ABNORMAL
ANION GAP SERPL CALC-SCNC: 8 MMOL/L (ref 10–20)
APTT PPP: 45 SECONDS (ref 22–30)
APTT PPP: ABNORMAL S
BUN SERPL-MCNC: 14 MG/DL (ref 6–20)
BUN/CREAT SERPL: 16 (ref 7–25)
CALCIUM SERPL-MCNC: 8.3 MG/DL (ref 8.4–10.2)
CHLORIDE: 106 MMOL/L (ref 98–107)
CO2 SERPL-SCNC: 29 MMOL/L (ref 21–32)
CREAT SERPL-MCNC: 0.88 MG/DL (ref 0.51–0.95)
ERYTHROCYTE [DISTWIDTH] IN BLOOD: 18.1 % (ref 11–15)
GLUCOSE SERPL-MCNC: 103 MG/DL (ref 65–99)
HEMATOCRIT: 20.9 % (ref 36–46.5)
HEMATOCRIT: 24.4 % (ref 36–46.5)
HGB BLD-MCNC: 6.4 GM/DL (ref 12–15.5)
HGB BLD-MCNC: 7.6 GM/DL (ref 12–15.5)
INR PPP: 1.1
MCH RBC QN AUTO: 23.7 PG (ref 26–34)
MCHC RBC AUTO-ENTMCNC: 30.6 GM/DL (ref 32–36.5)
MCV RBC AUTO: 77.4 FL (ref 78–100)
NRBC (NRBCRE): ABNORMAL
PLATELET # BLD: 294 THOUSAND/MCL (ref 140–450)
POTASSIUM SERPL-SCNC: 3.6 MMOL/L (ref 3.4–5.1)
PROTHROMBIN TIME: 10.7 SECONDS (ref 9.7–11.8)
PROTHROMBIN TIME: NORMAL
RBC # BLD: 2.7 MILLION/MCL (ref 4–5.2)
SODIUM SERPL-SCNC: 139 MMOL/L (ref 135–145)
WBC # BLD: 6.2 THOUSAND/MCL (ref 4.2–11)

## 2018-07-08 LAB
ANALYZER ANC (IANC): ABNORMAL
ANION GAP SERPL CALC-SCNC: 7 MMOL/L (ref 10–20)
BUN SERPL-MCNC: 9 MG/DL (ref 6–20)
BUN/CREAT SERPL: 11 (ref 7–25)
CALCIUM SERPL-MCNC: 8.4 MG/DL (ref 8.4–10.2)
CHLORIDE: 108 MMOL/L (ref 98–107)
CO2 SERPL-SCNC: 28 MMOL/L (ref 21–32)
CREAT SERPL-MCNC: 0.8 MG/DL (ref 0.51–0.95)
ERYTHROCYTE [DISTWIDTH] IN BLOOD: 17.8 % (ref 11–15)
GLUCOSE SERPL-MCNC: 107 MG/DL (ref 65–99)
HEMATOCRIT: 26.5 % (ref 36–46.5)
HGB BLD-MCNC: 8.1 GM/DL (ref 12–15.5)
MCH RBC QN AUTO: 24.6 PG (ref 26–34)
MCHC RBC AUTO-ENTMCNC: 30.6 GM/DL (ref 32–36.5)
MCV RBC AUTO: 80.5 FL (ref 78–100)
NRBC (NRBCRE): ABNORMAL
PLATELET # BLD: 271 THOUSAND/MCL (ref 140–450)
POTASSIUM SERPL-SCNC: 3.8 MMOL/L (ref 3.4–5.1)
RBC # BLD: 3.29 MILLION/MCL (ref 4–5.2)
SODIUM SERPL-SCNC: 139 MMOL/L (ref 135–145)
WBC # BLD: 5 THOUSAND/MCL (ref 4.2–11)

## 2018-07-09 LAB
ANALYZER ANC (IANC): ABNORMAL
ERYTHROCYTE [DISTWIDTH] IN BLOOD: 18.5 % (ref 11–15)
HEMATOCRIT: 27.4 % (ref 36–46.5)
HGB BLD-MCNC: 8.3 GM/DL (ref 12–15.5)
MCH RBC QN AUTO: 24.7 PG (ref 26–34)
MCHC RBC AUTO-ENTMCNC: 30.3 GM/DL (ref 32–36.5)
MCV RBC AUTO: 81.5 FL (ref 78–100)
NRBC (NRBCRE): ABNORMAL
PLATELET # BLD: 308 THOUSAND/MCL (ref 140–450)
RBC # BLD: 3.36 MILLION/MCL (ref 4–5.2)
WBC # BLD: 6.7 THOUSAND/MCL (ref 4.2–11)

## (undated) NOTE — MR AVS SNAPSHOT
[address]  [address]  [facility] [place] [address] [phone]               Thank you for choosing us for your health care visit with Loraine Cervantes MD.  We are glad to serve you and happy to provide you with this summary of · Do not cut, squeeze, or pop the boil yourself. · Apply antibiotic cream or ointment to the skin 3 to 4 times a day, unless something else was prescribed. Some ointments include an antibiotic plus a pain reliever.   · If your doctor prescribed antibiotics Take 1 tablet by mouth 2 (two) times daily. Commonly known as:  AUGMENTIN           aspirin 81 MG Tabs   Take 81 mg by mouth daily. Atorvastatin Calcium 10 MG Tabs   Take 1 tablet (10 mg total) by mouth nightly.    Commonly known as:  LIPITOR

## (undated) NOTE — LETTER
10/10/17        Mitra Guerra  [address] [place] [address]      Dear Surendra Mealing records indicate that you have outstanding lab work and or testing that was ordered for you and has not yet been completed:          LIPID PANEL [2580] [Q]

## (undated) NOTE — Clinical Note
March 29, 2017    Eduardo Trujillo  [address] [place] [address]      Dear Gena Fisher: It was a pleasure speaking with you over the phone recently.  To follow up, I wanted to send you my contact information to utilize when you have a question and or n allow us to bill for these services during any month that we have provided at least 20 minutes of non face-to-face care of you and your conditions. You will have a Nurse Care Manager overseeing your care.   Sometimes other staff from our practice will ta ask your Nurse Care Manager for the Chronic Care Management Services Termination form.        Our goal is to provide you with the best care possible, to keep you out of the hospital, and to minimize costs and inconvenience to you due to unnecessary visits t